# Patient Record
Sex: MALE | Race: WHITE | Employment: FULL TIME | URBAN - METROPOLITAN AREA
[De-identification: names, ages, dates, MRNs, and addresses within clinical notes are randomized per-mention and may not be internally consistent; named-entity substitution may affect disease eponyms.]

---

## 2019-10-15 ENCOUNTER — OFFICE VISIT (OUTPATIENT)
Dept: OTOLARYNGOLOGY | Facility: CLINIC | Age: 59
End: 2019-10-15
Payer: COMMERCIAL

## 2019-10-15 VITALS
WEIGHT: 175 LBS | BODY MASS INDEX: 27.47 KG/M2 | TEMPERATURE: 98 F | SYSTOLIC BLOOD PRESSURE: 105 MMHG | HEIGHT: 67 IN | DIASTOLIC BLOOD PRESSURE: 76 MMHG

## 2019-10-15 DIAGNOSIS — H61.23 BILATERAL IMPACTED CERUMEN: ICD-10-CM

## 2019-10-15 DIAGNOSIS — R42 DIZZINESS: Primary | ICD-10-CM

## 2019-10-15 PROCEDURE — 99203 OFFICE O/P NEW LOW 30 MIN: CPT | Performed by: OTOLARYNGOLOGY

## 2019-10-15 PROCEDURE — 92504 EAR MICROSCOPY EXAMINATION: CPT | Performed by: OTOLARYNGOLOGY

## 2019-10-15 NOTE — PROGRESS NOTES
Kendra Cota  is a 64year old male. Patient presents with:  Dizziness: dizziness for a week    HPI:   Last week he started having problems with dizziness. He woke up from a nap and felt as though the world was around him and he was spinning.   This Posterior cervical. Supraclavicular.    Eyes Normal Conjunctiva - Right: Normal, Left: Normal. Pupil - Right: Normal, Left: Normal.    Ears Normal Inspection - Right: Normal, Left: Normal. Canal - Left: Normal. TM - Right: Normal, Left: Normal.  Large amoun

## 2020-07-07 NOTE — H&P
[address] [facility] [address] Gastroenterology                                                                                                  Clinic History and Physical     Pa Medications   Medication Sig Dispense Refill   • PEG 3350-KCl-Na Bicarb-NaCl (TRILYTE) 420 g Oral Recon Soln Take prep as directed by gastro office.  May substitute with Trilyte/generic equivalent if needed 1 Bottle 0       Allergies:  No Known Allergies year-old male pt of Dr. Rosalee Hernandez (independant provider) with no chronic medical hx who presents for colon cancer screening evaluation.         1. Screening Colonoscopy: No family hx of colon cancer and it is appropriate to proceed with screening colon indicated. Orders This Visit:  No orders of the defined types were placed in this encounter.       Meds This Visit:  Requested Prescriptions     Signed Prescriptions Disp Refills   • PEG 3350-KCl-Na Bicarb-NaCl (TRILYTE) 420 g Oral Recon Soln 1 Bottle

## 2020-07-08 ENCOUNTER — APPOINTMENT (OUTPATIENT)
Dept: GENERAL RADIOLOGY | Facility: HOSPITAL | Age: 60
End: 2020-07-08
Attending: EMERGENCY MEDICINE
Payer: COMMERCIAL

## 2020-07-08 ENCOUNTER — HOSPITAL ENCOUNTER (EMERGENCY)
Facility: HOSPITAL | Age: 60
Discharge: HOME | End: 2020-07-08
Attending: EMERGENCY MEDICINE
Payer: COMMERCIAL

## 2020-07-08 VITALS
HEART RATE: 54 BPM | BODY MASS INDEX: 28.25 KG/M2 | HEIGHT: 67 IN | OXYGEN SATURATION: 98 % | SYSTOLIC BLOOD PRESSURE: 137 MMHG | TEMPERATURE: 98 F | DIASTOLIC BLOOD PRESSURE: 85 MMHG | RESPIRATION RATE: 16 BRPM | WEIGHT: 180 LBS

## 2020-07-08 DIAGNOSIS — S32.000A COMPRESSION FRACTURE OF LUMBAR VERTEBRA, INITIAL ENCOUNTER, UNSPECIFIED LUMBAR VERTEBRAL LEVEL: Primary | ICD-10-CM

## 2020-07-08 PROCEDURE — 73502 X-RAY EXAM HIP UNI 2-3 VIEWS: CPT | Performed by: EMERGENCY MEDICINE

## 2020-07-08 PROCEDURE — 72100 X-RAY EXAM L-S SPINE 2/3 VWS: CPT | Performed by: EMERGENCY MEDICINE

## 2020-07-08 PROCEDURE — 99284 EMERGENCY DEPT VISIT MOD MDM: CPT

## 2020-07-08 NOTE — ED PROVIDER NOTES
Patient Seen in: [facility] Emergency Department      History   Patient presents with:  Hip Pain    Stated Complaint: Left flank/ hip/ back pain. 2 weeks    HPI    Patient is a 51-year-old male who presents with left hip pain x2 weeks.   Progressiv display         MDM     Xr Lumbar Spine (min 2 Views) (cpt=72100)    Result Date: 7/8/2020  CONCLUSION:  1. Severe rotary thoracolumbar scoliosis with severe multi level spondylosis as discussed above.   Moderately severe chronic appearing compression defor

## 2020-07-15 ENCOUNTER — TELEPHONE (OUTPATIENT)
Dept: GASTROENTEROLOGY | Facility: CLINIC | Age: 60
End: 2020-07-15

## 2020-07-15 ENCOUNTER — OFFICE VISIT (OUTPATIENT)
Dept: GASTROENTEROLOGY | Facility: CLINIC | Age: 60
End: 2020-07-15
Payer: COMMERCIAL

## 2020-07-15 VITALS
HEART RATE: 61 BPM | HEIGHT: 65.5 IN | TEMPERATURE: 98 F | DIASTOLIC BLOOD PRESSURE: 83 MMHG | BODY MASS INDEX: 30.32 KG/M2 | WEIGHT: 184.19 LBS | SYSTOLIC BLOOD PRESSURE: 126 MMHG

## 2020-07-15 DIAGNOSIS — Z12.11 COLON CANCER SCREENING: Primary | ICD-10-CM

## 2020-07-15 DIAGNOSIS — Z12.11 SCREENING FOR COLON CANCER: Primary | ICD-10-CM

## 2020-07-15 PROCEDURE — 99203 OFFICE O/P NEW LOW 30 MIN: CPT | Performed by: NURSE PRACTITIONER

## 2020-07-15 RX ORDER — POLYETHYLENE GLYCOL 3350, SODIUM CHLORIDE, SODIUM BICARBONATE, POTASSIUM CHLORIDE 420; 11.2; 5.72; 1.48 G/4L; G/4L; G/4L; G/4L
POWDER, FOR SOLUTION ORAL
Qty: 1 BOTTLE | Refills: 0 | Status: SHIPPED | OUTPATIENT
Start: 2020-07-15

## 2020-07-15 NOTE — TELEPHONE ENCOUNTER
Scheduled for:  Colonoscopy 65968  Provider Name:    Date:  9/28/2020  Location:  [facility]  Sedation:  Mac  Time:  10:00 Am (pt is aware to arrive at 0900)  Prep:  Colyte /Trilyte Prep instructions were given to pt in the office, pt verbalized [place]

## 2020-07-15 NOTE — PATIENT INSTRUCTIONS
-Schedule colonoscopy w/ Dr. Jose Blackwood with MAC or Dr. Robi Tony with [facility] or [facility]  Dx: screening   -Eligible for NE: Yes  -Prep: Split dose Colyte/TriLyte or equivalent  -Anti-platelets and anti-coagulants: None  -Diabetes meds: None    ** If MAC @ E

## 2020-09-25 ENCOUNTER — APPOINTMENT (OUTPATIENT)
Dept: LAB | Facility: HOSPITAL | Age: 60
End: 2020-09-25
Attending: INTERNAL MEDICINE
Payer: COMMERCIAL

## 2020-09-25 DIAGNOSIS — Z01.818 PREOP TESTING: ICD-10-CM

## 2020-09-25 LAB — SARS-COV-2 RNA RESP QL NAA+PROBE: NOT DETECTED

## 2020-09-28 ENCOUNTER — TELEPHONE (OUTPATIENT)
Dept: GASTROENTEROLOGY | Facility: CLINIC | Age: 60
End: 2020-09-28

## 2020-09-28 ENCOUNTER — HOSPITAL ENCOUNTER (OUTPATIENT)
Facility: HOSPITAL | Age: 60
Setting detail: HOSPITAL OUTPATIENT SURGERY
Discharge: HOME | End: 2020-09-28
Attending: INTERNAL MEDICINE | Admitting: INTERNAL MEDICINE
Payer: COMMERCIAL

## 2020-09-28 ENCOUNTER — ANESTHESIA (OUTPATIENT)
Dept: ENDOSCOPY | Facility: HOSPITAL | Age: 60
End: 2020-09-28
Payer: COMMERCIAL

## 2020-09-28 ENCOUNTER — ANESTHESIA EVENT (OUTPATIENT)
Dept: ENDOSCOPY | Facility: HOSPITAL | Age: 60
End: 2020-09-28
Payer: COMMERCIAL

## 2020-09-28 VITALS
TEMPERATURE: 97 F | WEIGHT: 170 LBS | SYSTOLIC BLOOD PRESSURE: 98 MMHG | BODY MASS INDEX: 26.68 KG/M2 | HEIGHT: 67 IN | HEART RATE: 57 BPM | RESPIRATION RATE: 14 BRPM | DIASTOLIC BLOOD PRESSURE: 72 MMHG | OXYGEN SATURATION: 96 %

## 2020-09-28 DIAGNOSIS — Z01.818 PREOP TESTING: Primary | ICD-10-CM

## 2020-09-28 DIAGNOSIS — Z12.11 COLON CANCER SCREENING: ICD-10-CM

## 2020-09-28 PROCEDURE — 45380 COLONOSCOPY AND BIOPSY: CPT | Performed by: INTERNAL MEDICINE

## 2020-09-28 PROCEDURE — 0DBK8ZX EXCISION OF ASCENDING COLON, VIA NATURAL OR ARTIFICIAL OPENING ENDOSCOPIC, DIAGNOSTIC: ICD-10-PCS | Performed by: INTERNAL MEDICINE

## 2020-09-28 PROCEDURE — 0DBN8ZX EXCISION OF SIGMOID COLON, VIA NATURAL OR ARTIFICIAL OPENING ENDOSCOPIC, DIAGNOSTIC: ICD-10-PCS | Performed by: INTERNAL MEDICINE

## 2020-09-28 PROCEDURE — 45385 COLONOSCOPY W/LESION REMOVAL: CPT | Performed by: INTERNAL MEDICINE

## 2020-09-28 RX ORDER — SODIUM CHLORIDE, SODIUM LACTATE, POTASSIUM CHLORIDE, CALCIUM CHLORIDE 600; 310; 30; 20 MG/100ML; MG/100ML; MG/100ML; MG/100ML
INJECTION, SOLUTION INTRAVENOUS CONTINUOUS
Status: DISCONTINUED | OUTPATIENT
Start: 2020-09-28 | End: 2020-09-28

## 2020-09-28 RX ORDER — LIDOCAINE HYDROCHLORIDE 10 MG/ML
INJECTION, SOLUTION EPIDURAL; INFILTRATION; INTRACAUDAL; PERINEURAL AS NEEDED
Status: DISCONTINUED | OUTPATIENT
Start: 2020-09-28 | End: 2020-09-28 | Stop reason: SURG

## 2020-09-28 RX ORDER — NALOXONE HYDROCHLORIDE 0.4 MG/ML
80 INJECTION, SOLUTION INTRAMUSCULAR; INTRAVENOUS; SUBCUTANEOUS AS NEEDED
Status: DISCONTINUED | OUTPATIENT
Start: 2020-09-28 | End: 2020-09-28

## 2020-09-28 RX ADMIN — LIDOCAINE HYDROCHLORIDE 40 MG: 10 INJECTION, SOLUTION EPIDURAL; INFILTRATION; INTRACAUDAL; PERINEURAL at 10:03:00

## 2020-09-28 RX ADMIN — SODIUM CHLORIDE, SODIUM LACTATE, POTASSIUM CHLORIDE, CALCIUM CHLORIDE: 600; 310; 30; 20 INJECTION, SOLUTION INTRAVENOUS at 10:22:00

## 2020-09-28 NOTE — H&P
History & Physical Examination    Patient Name: Clara Yeung  MRN: [id number]  CSN: [id number]  YOB: 1960    Diagnosis:   Colon screening        •  PEG 3350-KCl-Na Bicarb-NaCl (TRILYTE) 420 g Oral Recon Soln, Take prep as directed by gastro

## 2020-09-28 NOTE — ANESTHESIA POSTPROCEDURE EVALUATION
Patient: Augustus Bustillo    Procedure Summary     Date: 09/28/20 Room / Location: [facility] ENDOSCOPY 05 / [facility] ENDOSCOPY    Anesthesia Start: 4315 Anesthesia Stop: 3907    Procedure: COLONOSCOPY (N/A ) Diagnosis:       Colon cancer screening      (Polyps, early d

## 2020-09-28 NOTE — TELEPHONE ENCOUNTER
Left message for patient to call back to review below result note. Health Maintenance updated. 3 year colonoscopy recall entered into patient outreach in [address] [place] [address] Rd. Patient due for next colonoscopy 9/28/2023.

## 2020-09-28 NOTE — OPERATIVE REPORT
[facility] HOSP - [facility] Endoscopy Report      Preoperative Diagnosis:  - colon cancer screening      Postoperative Diagnosis:  - colon polyps x 3  - early diverticular disease  - internal hemorrhoids      Procedure:    Colonoscopy   Esophagogastroduoden diverticular disease  - Symptomatic treatment of hemorrhoids          Malorie Stanford.  Chicho Olea MD  9/28/2020  10:26 AM

## 2020-09-28 NOTE — ANESTHESIA PREPROCEDURE EVALUATION
Anesthesia PreOp Note    HPI:     Darling Jones is a 64year old male who presents for preoperative consultation requested by: Drake Siemens, MD    Date of Surgery: 9/28/2020    Procedure(s):  COLONOSCOPY  Indication: Colon cancer screening    Relevant file        Minutes per session: Not on file      Stress: Not on file    Relationships      Social connections        Talks on phone: Not on file        Gets together: Not on file        Attends [employer] service: Not on file        Active member of [employer]      I have informed Eve Rucker and/or legal guardian or family member of the nature of the anesthetic plan, benefits, risks including possible dental damage if relevant, major complications, and any alternative forms of anesthetic management.

## 2020-09-28 NOTE — TELEPHONE ENCOUNTER
----- Message from Joesph Cogan, MD sent at 9/28/2020  4:44 PM CDT -----  I wanted to get back to you with your colonoscopy results. You had 3 colon polyps removed which were benign.   I would advise a repeat colonoscopy in 3 years to make sure no new p

## 2021-01-18 ENCOUNTER — OFFICE VISIT (OUTPATIENT)
Dept: INTERNAL MEDICINE CLINIC | Facility: CLINIC | Age: 61
End: 2021-01-18
Payer: COMMERCIAL

## 2021-01-18 VITALS
TEMPERATURE: 98 F | HEIGHT: 67 IN | OXYGEN SATURATION: 99 % | HEART RATE: 67 BPM | DIASTOLIC BLOOD PRESSURE: 74 MMHG | BODY MASS INDEX: 29.03 KG/M2 | WEIGHT: 185 LBS | RESPIRATION RATE: 17 BRPM | SYSTOLIC BLOOD PRESSURE: 116 MMHG

## 2021-01-18 DIAGNOSIS — Z01.818 PRE-OP EVALUATION: Primary | ICD-10-CM

## 2021-01-18 DIAGNOSIS — S69.92XA INJURY OF LEFT WRIST, INITIAL ENCOUNTER: ICD-10-CM

## 2021-01-18 PROCEDURE — 3008F BODY MASS INDEX DOCD: CPT | Performed by: INTERNAL MEDICINE

## 2021-01-18 PROCEDURE — 3078F DIAST BP <80 MM HG: CPT | Performed by: INTERNAL MEDICINE

## 2021-01-18 PROCEDURE — 99204 OFFICE O/P NEW MOD 45 MIN: CPT | Performed by: INTERNAL MEDICINE

## 2021-01-18 PROCEDURE — 3074F SYST BP LT 130 MM HG: CPT | Performed by: INTERNAL MEDICINE

## 2021-01-18 PROCEDURE — 93000 ELECTROCARDIOGRAM COMPLETE: CPT | Performed by: INTERNAL MEDICINE

## 2021-01-18 NOTE — PROGRESS NOTES
HPI:    Patient ID: Mckenna Garibay is a 64year old male. HPI  Patient comes in today for first time for preop clearance for left wrist surgery.   Patient works as a  in the past and injured his right shoulder finally got clearance but insuranc oriented to person, place, and time. He appears well-developed and well-nourished. HENT:   Head: Normocephalic and atraumatic. Eyes: Pupils are equal, round, and reactive to light. Conjunctivae and EOM are normal.   Neck: Normal range of motion.  Neck s

## 2021-01-21 ENCOUNTER — TELEPHONE (OUTPATIENT)
Dept: INTERNAL MEDICINE CLINIC | Facility: CLINIC | Age: 61
End: 2021-01-21

## 2021-01-21 NOTE — TELEPHONE ENCOUNTER
Patient walked into the [facility] office with lab results from [facility] patient states he needs all results from last weeks visit to be sent to [employer] [facility]ale and Dr Jackie Chery office.   All information is on form placed on Dr Sol Butler desk

## 2021-01-26 NOTE — TELEPHONE ENCOUNTER
Faxed out to Dr Kathy Singh Surgical Clearance; H&P, Labs, EKG to both #'s provided during his visit. BOTH failed multiple times. Called drs office to ask for an alt fax#, was given same fax#, asked if clearance could be emailed.    Did a one time email

## 2023-06-30 ENCOUNTER — TELEPHONE (OUTPATIENT)
Facility: CLINIC | Age: 63
End: 2023-06-30

## (undated) DEVICE — SNARE ENDOSCOPIC 10MM ROUND

## (undated) DEVICE — LINE MNTR ADLT SET O2 INTMD

## (undated) DEVICE — Device: Brand: DEFENDO AIR/WATER/SUCTION AND BIOPSY VALVE

## (undated) DEVICE — SNARE OPTMZ PLPCTM TRP

## (undated) DEVICE — FORCEP RADIAL JAW 4

## (undated) DEVICE — Device: Brand: CUSTOM PROCEDURE KIT

## (undated) DEVICE — MEDI-VAC NON-CONDUCTIVE SUCTION TUBING 6MM X 1.8M (6FT.) L: Brand: CARDINAL HEALTH

## (undated) DEVICE — 35 ML SYRINGE REGULAR TIP: Brand: MONOJECT